# Patient Record
(demographics unavailable — no encounter records)

---

## 2025-04-29 NOTE — CONSULT LETTER
[Dear  ___] : Dear  [unfilled], [Consult Letter:] : I had the pleasure of evaluating your patient, [unfilled]. [Please see my note below.] : Please see my note below. [Consult Closing:] : Thank you very much for allowing me to participate in the care of this patient.  If you have any questions, please do not hesitate to contact me. [Sincerely,] : Sincerely, [FreeTextEntry3] : Brenda Palacio MD Child Neurologist [address], [address] [place] [address] Phone: [phone]

## 2025-04-29 NOTE — HISTORY OF PRESENT ILLNESS
[FreeTextEntry1] : DESIRE KAY JR is a 2 year old boy who presents for initial evaluation for not bearing weight on his RLE.  He was referred by his pediatrician.  Seen with his mother and grandmother.  In late March, he stopped bearing weight on his right leg.  He seemed somewhat uncomfortable and preferred walking on his left side.  There was no preceding fall, injury, or associated redness/swelling or rash.  His sister had been sick the week prior, but at the time he was not febrile and otherwise well appearing.  His pediatrician sent tests including CBC, ESR, Lyme, and xrays of the right knee and hip.  Labs reviewed.  Video on 3/25 reviewed- child is holding hands with his parent and walks leaning on left side, nonweight bearing on right.  His symptoms resolved after three days.  He is able to run, climb, jump without difficulty.  No concern for back or lower extremity pain.  No vomiting, or bowel/bladder complaints.  Born FT, unremarkable delivery Developmentally, he is receiving ST and special instruction.  He can say 20 words and is making progress with therapies.  He was walking after one year.

## 2025-04-29 NOTE — PHYSICAL EXAM
[Well-appearing] : well-appearing [Normocephalic] : normocephalic [No dysmorphic facial features] : no dysmorphic facial features [No ocular abnormalities] : no ocular abnormalities [Neck supple] : neck supple [Straight] : straight [No shahab or dimples] : no shahab or dimples [No deformities] : no deformities [Alert] : alert [Well related, good eye contact] : well related, good eye contact [Single words] : single words [Pupils reactive to light] : pupils reactive to light [Turns to light] : turns to light [Tracks face, light or objects with full extraocular movements] : tracks face, light or objects with full extraocular movements [Responds to touch on face] : responds to touch on face [No facial asymmetry or weakness] : no facial asymmetry or weakness [No nystagmus] : no nystagmus [Responds to voice/sounds] : responds to voice/sounds [Good shoulder shrug] : good shoulder shrug [Midline tongue] : midline tongue [No fasciculations] : no fasciculations [Normal axial and appendicular muscle tone with symmetric limb movements] : normal axial and appendicular muscle tone with symmetric limb movements [Normal bulk] : normal bulk [Reaches for toys and or gives high five] : reaches for toys and or gives high five [Good  bilaterally] : good  bilaterally [5/5 strength in proximal and distal muscles of arms and legs] : 5/5 strength in proximal and distal muscles of arms and legs [No abnormal involuntary movements] : no abnormal involuntary movements [Stands alone] : stands alone [Walks well for age] : walks well for age [Running] : running [2+ biceps] : 2+ biceps [Triceps] : triceps [Knee jerks] : knee jerks [Ankle jerks] : ankle jerks [No ankle clonus] : no ankle clonus [Bilaterally] : bilaterally [Responds to touch and tickle] : responds to touch and tickle [No dysmetria in reaching for objects and or on FTNT] : no dysmetria in reaching for objects and or on FTNT [Good standing and or walking balance for age, no ataxia] : good standing and or walking balance for age, no ataxia

## 2025-04-29 NOTE — ASSESSMENT
[FreeTextEntry1] : 2 year old boy with mild speech delay here for evaluation after an episode of nonweight bearing on the right lower extremity x 3 days in March, now resolved.  Normal neurological exam.

## 2025-04-29 NOTE — PLAN
[FreeTextEntry1] : Reviewed prior labs and he had elevated ESR (23) and mild lymphocytosis, suggestive of a viral illness in March.  Ddx includes viral myositis (CK not checked) or transient synovitis. Given his resolution of symptoms, no further testing recommended at this time Return as needed if symptoms recur